# Patient Record
Sex: FEMALE | URBAN - METROPOLITAN AREA
[De-identification: names, ages, dates, MRNs, and addresses within clinical notes are randomized per-mention and may not be internally consistent; named-entity substitution may affect disease eponyms.]

---

## 2017-03-20 ENCOUNTER — OFFICE VISIT (OUTPATIENT)
Dept: SURGERY | Facility: CLINIC | Age: 62
End: 2017-03-20

## 2017-03-20 VITALS
HEART RATE: 61 BPM | SYSTOLIC BLOOD PRESSURE: 129 MMHG | BODY MASS INDEX: 32.27 KG/M2 | RESPIRATION RATE: 12 BRPM | DIASTOLIC BLOOD PRESSURE: 78 MMHG | TEMPERATURE: 99 F | HEIGHT: 60 IN | WEIGHT: 164.38 LBS

## 2017-03-20 DIAGNOSIS — K57.32 DIVERTICULITIS OF LARGE INTESTINE WITHOUT PERFORATION OR ABSCESS WITHOUT BLEEDING: Primary | ICD-10-CM

## 2017-03-20 PROBLEM — K57.92 DIVERTICULITIS OF INTESTINE WITHOUT PERFORATION OR ABSCESS WITHOUT BLEEDING: Status: ACTIVE | Noted: 2017-03-20

## 2017-03-20 PROCEDURE — 99243 OFF/OP CNSLTJ NEW/EST LOW 30: CPT | Performed by: SURGERY

## 2017-03-20 RX ORDER — ATENOLOL 50 MG/1
TABLET ORAL
Refills: 2 | COMMUNITY
Start: 2017-02-15

## 2017-03-20 RX ORDER — AMOXICILLIN AND CLAVULANATE POTASSIUM 875; 125 MG/1; MG/1
1 TABLET, FILM COATED ORAL 2 TIMES DAILY
Qty: 20 TABLET | Refills: 0 | Status: SHIPPED | OUTPATIENT
Start: 2017-03-20 | End: 2017-03-30

## 2017-03-20 RX ORDER — VALSARTAN AND HYDROCHLOROTHIAZIDE 320; 12.5 MG/1; MG/1
TABLET, FILM COATED ORAL
Refills: 2 | COMMUNITY
Start: 2017-02-04

## 2017-04-11 ENCOUNTER — OFFICE VISIT (OUTPATIENT)
Dept: SURGERY | Facility: CLINIC | Age: 62
End: 2017-04-11

## 2017-04-11 VITALS
DIASTOLIC BLOOD PRESSURE: 82 MMHG | HEART RATE: 63 BPM | WEIGHT: 164 LBS | SYSTOLIC BLOOD PRESSURE: 146 MMHG | HEIGHT: 60 IN | BODY MASS INDEX: 32.2 KG/M2

## 2017-04-11 DIAGNOSIS — K57.32 DIVERTICULITIS OF LARGE INTESTINE WITHOUT PERFORATION OR ABSCESS WITHOUT BLEEDING: Primary | ICD-10-CM

## 2017-04-11 PROCEDURE — 99213 OFFICE O/P EST LOW 20 MIN: CPT | Performed by: SURGERY

## 2017-04-11 NOTE — PATIENT INSTRUCTIONS
DR. ESCOBEDO'S HIGH FIBER DIET    This high fiber diet includes two major components. One is a natural high fiber that is dietary high. The other is a fiber supplement.     Natural Dietary Fiber:    These are very few products on the market that have high en forming. They are simply an equivalent to a ground up apple peel. That is they just provide extra natural fiber. They will enter your colon in a non-digestable form providing bulk to your stool.   Therefore, they are safe to take on a daily basis for the following problems:    1. Most people describe a gassy or bloated feeling for the first ten days to two weeks. This will pass with time. I recommend that once you get on the diet that you stay on it and complete it as prescribed.   Again, you will enjoy n

## 2017-04-11 NOTE — PROGRESS NOTES
Follow Up Visit Note       Active Problems      1.  Diverticulitis of large intestine without perforation or abscess without bleeding          Chief Complaint   Patient presents with:  Diverticulitis: Est. pt in for further care and treatment for Diverticul breath and wheezing. Cardiovascular: Negative for chest pain, palpitations and leg swelling. Gastrointestinal: Negative for nausea, vomiting, abdominal pain, diarrhea, constipation, blood in stool, abdominal distention and anal bleeding.    Gerardine Borne Devi Nassar MD

## 2017-05-09 ENCOUNTER — OFFICE VISIT (OUTPATIENT)
Dept: SURGERY | Facility: CLINIC | Age: 62
End: 2017-05-09

## 2017-05-09 VITALS
BODY MASS INDEX: 32.39 KG/M2 | SYSTOLIC BLOOD PRESSURE: 151 MMHG | WEIGHT: 165 LBS | RESPIRATION RATE: 18 BRPM | DIASTOLIC BLOOD PRESSURE: 94 MMHG | HEART RATE: 76 BPM | HEIGHT: 60 IN

## 2017-05-09 DIAGNOSIS — K57.32 DIVERTICULITIS OF LARGE INTESTINE WITHOUT PERFORATION OR ABSCESS WITHOUT BLEEDING: Primary | ICD-10-CM

## 2017-05-09 PROCEDURE — 99213 OFFICE O/P EST LOW 20 MIN: CPT | Performed by: SURGERY

## 2017-05-09 NOTE — PROGRESS NOTES
Follow Up Visit Note       Active Problems      1.  Diverticulitis of large intestine without perforation or abscess without bleeding          Chief Complaint   Patient presents with:  Diverticulitis: pt here for 4 week Diverticulitis of large intestine wit apnea, cough, shortness of breath and wheezing. Cardiovascular: Negative for chest pain, palpitations and leg swelling.    Gastrointestinal: Negative for nausea, vomiting, abdominal pain, diarrhea, constipation, blood in stool, abdominal distention and a

## 2024-11-21 NOTE — H&P
[facility]  Obstetric Progress Note    Patient Name: Nancy Cormier  :  1990  MRN:  [id number]  Hospital Day: 6  EGA: 28w5d      Subjective   Denies cramping, bleeding, chest pain, shortness of breath, headache, vision changes, RUQ pain. Good FM x both babies.      Objective     VS:  Vital Signs Range for the last 24 hours  Temperature: Temp:  [97.7 °F (36.5 °C)-98.5 °F (36.9 °C)] 98.5 °F (36.9 °C)   Temp Source: Temp src: Oral   BP: BP: (123-136)/(75-96) 136/96   Pulse: Heart Rate:  [82-96] 89   Respirations: Resp:  [16-18] 18                   Physical Examination:     General :  Alert in NAD  Abdomen: Gravid, Fundus - nontender    SCE: /-1 on last check 11/15/24       Results from last 7 days   Lab Units 24  0554 24  1359 11/15/24  1510 11/15/24  1155   WBC 10*3/mm3 17.69* 18.88*  --  15.65*   HEMOGLOBIN g/dL 12.3 12.2  --  13.4   PLATELETS 10*3/mm3 340 361  --  397   POTASSIUM mmol/L 4.3 4.1  --  4.1   ALT (SGPT) U/L 15 12  --  15   AST (SGOT) U/L 15 14  --  20   CREATININE mg/dL 0.54* 0.54*  --  0.63   BILIRUBIN mg/dL <0.2 <0.2  --  0.2   PROT/CREAT RATIO UR mg/G Crea  --   --  196.4  --          ROM+ positive      A/P: 33 y.o.  at 28w5d with di/di twin gestation admitted with PPROM (twin A)    PPROM - occurred 11/15/24 @ 0930  Latency antibiotics, day 5  S/p Mag for NP,- to restart if delivery is imminent   Restarted yesterday afternoon for 12 hours  S/p BMZ on 11/15/24 & 24 at 1137,   Inpatient until delivery at 34wks or earlier if indicated    Fetal status  Di/di twin gestation   NST TID - reassuring.   BMZ as above for FLM  BPP MWF  : Twin A with absent and iREDF, BPP 8/8; Twin B with normal dopplers BPP 6/8  : Twin A with absent dopplers BPP 6/8 (-2 for fluid) and Twin B with elevated dopplers BPP 8/8  Growth US q4wks    Apical VSD Twin B  Appreciate MFM input  S/p fetal ECHO  OK for delivery at [facility], [place] per Dr. Rodriguez  Plan  ECHO    CHTN - No  New Patient Visit Note       Active Problems      1. Diverticulitis of large intestine without perforation or abscess without bleeding        Chief Complaint   Patient presents with:  Diverticulitis: NP, seen @ [facility] on 3/5/17.  Had CT abd/pelvis evidence of pre-eclampsia at this time.   Continue Carvedilol 25mg BID and ProcardiaXL 30mg BID  Weekly PIH labs  Continue LDA  BP well controlled. Continue carvedilol 25mg BID, Procardia 60mg qAM and 30mg qPM  H/o SVT  S/p Cardiology and MFM consults - appreciate recs  EKG - sinus rhythm, delayed R wave progression per Cardiology note  Normal ECHO in past  Plan telemetry during labor (if vaginal delivery) and for 6hrs postpartum     BMI 42  SCDs while hospitalized     Prenatal care  Due for 1hr GCT, will plan after completion of steroids--scheduled for               premature rupture of membranes in third trimester    Chronic hypertension affecting pregnancy    Obesity affecting pregnancy in third trimester              Jenna Miranda, APRN  2024  10:03 EST       mouth 2 (two) times daily. Disp: 20 tablet Rfl: 0         Review of Systems  The Review of Systems has been reviewed by me during today. Review of Systems   Constitutional: Negative for fever, chills, diaphoresis, fatigue and unexpected weight change.    H (Levaquin and Fagyl) but still with mild pain. Plan   · Start Augmentin for 14 day duration. · Pt to keep scheduled colonoscopy appointment with the gastroenterologist.  · Instructed to call the gastroenterologist if bleeding continues.   · Follow-up in

## (undated) NOTE — MR AVS SNAPSHOT
[facility]  [address] [address] [phone]               Thank you for choosing us for your health care visit with Catherine Bell MD.  We are glad to serve you and happy to provide you with this summary of yo able to eat other cereals directly with milk. However, in order to make these cereals more palatable, I recommend mixing them with one of the following items:    1.  Other Cereals:  I personally like mixing it with Raisin Bran which also adds a significant of them require that you take enough fluid along with the fiber supplement to insure proper digestion of these materials. Common questions regarding a high fiber diet:    Q. \"Doctor I really take in a lot of lettuce and vegetables.   Do I really need to flatus is not medically a bad thing. It is only socially unacceptable    We hope you have found these instructions helpful. If you have any questions regarding it, please discuss them with your Doctor during your next visit.        Allergies as of Apr 11, dairy products with reduced content of saturated and total fat.    Dietary sodium reduction Reduce dietary sodium intake to <= 100 mmol per day (2.4 g sodium or 6 g sodium chloride)   Aerobic physical activity Regular aerobic physical activity (e.g., brisk

## (undated) NOTE — MR AVS SNAPSHOT
[facility]  [address] [address] [phone]               Thank you for choosing us for your health care visit with Ousmane Rogers MD.  We are glad to serve you and happy to provide you with this summary of yo [employer] will allow you to access patient instructions from your recent visit,  view other health information, and more. To sign up or find more information, go to [email or web address] and click on the Sign Up Now link in the [employer] box.      Enter 2 ½ hours per week – spread out over time Use a ramírez to keep you motivated   Don’t forget strength training with weights and resistance Set goals and track your progress   You don’t need to join a gym. Home exercises work great.  Put more priority on exe

## (undated) NOTE — MR AVS SNAPSHOT
[facility]  [address] [address] [phone]               Thank you for choosing us for your health care visit with Rossy Gifford MD.  We are glad to serve you and happy to provide you with this summary of yo 2. Salad Dressing: You may mix it with salad dressing such as blue cheese or ranch dressing and add this to your salad.   3. Yogurt:  Mixing one of the above cereals directly with yogurt and making a \"slurpy\" is also very tasty and hides the undesirable breakfast cereal, the you may substitute this for the breakfast cereal.    Q. \"Doctor, I really do have normal bowel habits, and I really do not feel that I am constipated.  Are you sure this will help my condition in terms of my hemorrhoids, my fissure, m This list is accurate as of: 5/9/17  9:26 AM.  Always use your most recent med list.                atenolol 50 MG Tabs   Commonly known as:  TENORMIN           Valsartan-Hydrochlorothiazide 320-12.5 MG Tabs                   MyChart     Sign up for MyChar